# Patient Record
Sex: MALE | Race: WHITE | HISPANIC OR LATINO | URBAN - METROPOLITAN AREA
[De-identification: names, ages, dates, MRNs, and addresses within clinical notes are randomized per-mention and may not be internally consistent; named-entity substitution may affect disease eponyms.]

---

## 2020-06-17 ENCOUNTER — EMERGENCY (EMERGENCY)
Facility: HOSPITAL | Age: 50
LOS: 1 days | Discharge: HOME | End: 2020-06-17
Attending: EMERGENCY MEDICINE | Admitting: EMERGENCY MEDICINE
Payer: SELF-PAY

## 2020-06-17 VITALS
RESPIRATION RATE: 16 BRPM | DIASTOLIC BLOOD PRESSURE: 70 MMHG | SYSTOLIC BLOOD PRESSURE: 146 MMHG | TEMPERATURE: 98 F | OXYGEN SATURATION: 99 % | HEART RATE: 93 BPM

## 2020-06-17 DIAGNOSIS — Z98.890 OTHER SPECIFIED POSTPROCEDURAL STATES: Chronic | ICD-10-CM

## 2020-06-17 PROCEDURE — 99283 EMERGENCY DEPT VISIT LOW MDM: CPT

## 2020-06-17 RX ORDER — OXYCODONE AND ACETAMINOPHEN 5; 325 MG/1; MG/1
2 TABLET ORAL ONCE
Refills: 0 | Status: DISCONTINUED | OUTPATIENT
Start: 2020-06-17 | End: 2020-06-17

## 2020-06-17 RX ORDER — METOCLOPRAMIDE HCL 10 MG
10 TABLET ORAL ONCE
Refills: 0 | Status: DISCONTINUED | OUTPATIENT
Start: 2020-06-17 | End: 2020-06-17

## 2020-06-17 RX ADMIN — OXYCODONE AND ACETAMINOPHEN 2 TABLET(S): 5; 325 TABLET ORAL at 14:46

## 2020-06-17 NOTE — ED PROVIDER NOTE - PROGRESS NOTE DETAILS
Patient eloped from the department after receiving pain medication and prior to receiving discharge paperwork.

## 2020-06-17 NOTE — ED PROVIDER NOTE - ATTENDING CONTRIBUTION TO CARE
DR. PICHARDO, ATTENDING MD-  I performed a face to face bedside interview with the patient regarding history of present illness, review of symptoms and past medical history. I completed an independent physical exam.  I have discussed the patient's plan of care with the resident.   Documentation as above in the note.    49 y/o male h/o sickle cell, dvt, gastric ca p/w tooth pain after fx of tooth during failed extraction.  Due to see oral surgeon this evening for definitive extraction.  Denies f/c, discharge from tooth.  Fx tooth without sharp fragments, no gingival erythema or swelling.  Will control pain, dc with previously scheduled oral surgeon appt for definitive tx.

## 2020-06-17 NOTE — ED PROVIDER NOTE - CLINICAL SUMMARY MEDICAL DECISION MAKING FREE TEXT BOX
50M pmh sickle cell anemia, frequent DVTs, stage 4 stomach cancer who presents with tooth pain after recent breakage of his L lower molar tooth.  He states percocet works best for him.  He is out of his home pain medication and needs a dose to hold him over until surgery at 8pm.  His Hematologist from [place] prescribes his pain medication, unable to view ISTOP.  He will not get a prescription for pain medication, just a single dose here.

## 2020-06-17 NOTE — ED PROVIDER NOTE - OBJECTIVE STATEMENT
50M pmh sickle cell anemia, frequent DVTs, and stage 4 gastric cancer who presents after failed tooth extraction for pain control.  He has an appointment tonight at 8pm with his oral surgeon for replacement with implants, but states his pain is not controlled.  He is concerned for abscess, but has been taking clindamycin as prescribed (14d course ending today) and not had any significant swelling or painful swallowing.      A dental block was suggested for pain control and he states that this doesn't help him, he has tried it previously.  He would like percocet 10mg as this works for him at home.

## 2020-06-17 NOTE — ED PROVIDER NOTE - NS ED ROS FT
In additional the that documented in the HPI, the additional ROS was obtained:    CONSTITUTIONAL: No fever, no chills  EYES: no change in vision, no blurred vision  HEENT: no trouble swallowing, no trouble speaking, no sore throat  NECK: no pain, no stiffness  CV: no chest pain, no palpitations  RESP: no cough, no shortness of breath  GI: no abdominal pain, no nausea, no vomiting, no diarrhea, no constipation, no BRBPR or melena  : No dysuria, no frequency  NEURO: no headache, no new numbness, no weakness, no dizziness  SKIN: no new rashes  HEME: No easy bruising or bleeding  MSK: No joint pain, no recent trauma  Adalberto Amaya M.D. -Resident

## 2020-06-17 NOTE — ED PROVIDER NOTE - NSFOLLOWUPINSTRUCTIONS_ED_ALL_ED_FT
You were seen today with tooth pain.  You were offered an anesthetic block but refused this in favor of opioid pain medication.  You were not given a prescription for home pain medication, as your pain medication is managed by your hematologist.  You may follow up with them for additional pain medication.    Please follow up with your oral surgeon as scheduled today at 8pm.    If you have any severe increase in pain, fever, uncontrollable nausea or vomiting, or inability to tolerate eating and drinking you need to immediately return to the emergency room.

## 2020-06-17 NOTE — ED PROVIDER NOTE - PATIENT PORTAL LINK FT
You can access the FollowMyHealth Patient Portal offered by [facility] by registering at the following website: [email or web address]. By joining [facility]’s FollowMyHealth portal, you will also be able to view your health information using other applications (apps) compatible with our system.

## 2020-06-17 NOTE — ED ADULT TRIAGE NOTE - CHIEF COMPLAINT QUOTE
Pt states that he had 3 teeth pulled on 6/2/20, states that he has an abscess as a result.  Pt states that he has an appointment with an oral surgeon at 9pm [facility] but he cannot wait that long.  Pt states that he has hx of sickle cell and DVT as well as stomach cancer.  [facility] [place]

## 2020-06-17 NOTE — ED PROVIDER NOTE - PHYSICAL EXAMINATION
Vital signs reviewed.  CONSTITUTIONAL: NAD, awake  HEAD: Normocephalic; atraumatic  EYES: EOMI, no nystagmus, no conjunctival injection, no scleral icterus  MOUTH/THROAT:  MMM, cracked tooth present on L lower jaw; this area is painful but not swollen or erythematous  NECK: Trachea midline, no JVD, one shoddy, likely reactive LN on L submandibular area  CV: Normal S1, S2; no audible murmurs; extremities WWP  RESP: normal work of breathing; CTAB, no stridor  ABD: soft, non-distended; non-tender  : Deferred  MSK/EXT: no edema, normal ROM in all four extremities, no deformities  SKIN: No gross rashes or lesions on exposed skin, no significant trophic changes  NEURO: aaox3, moving all extremities purposefully and spontaneously